# Patient Record
Sex: FEMALE | URBAN - METROPOLITAN AREA
[De-identification: names, ages, dates, MRNs, and addresses within clinical notes are randomized per-mention and may not be internally consistent; named-entity substitution may affect disease eponyms.]

---

## 2023-09-27 ENCOUNTER — APPOINTMENT (RX ONLY)
Dept: URBAN - METROPOLITAN AREA CLINIC 125 | Facility: CLINIC | Age: 43
Setting detail: DERMATOLOGY
End: 2023-09-27

## 2023-09-27 DIAGNOSIS — Q826 OTHER SPECIFIED ANOMALIES OF SKIN: ICD-10-CM

## 2023-09-27 DIAGNOSIS — Q828 OTHER SPECIFIED ANOMALIES OF SKIN: ICD-10-CM

## 2023-09-27 DIAGNOSIS — L23.9 ALLERGIC CONTACT DERMATITIS, UNSPECIFIED CAUSE: ICD-10-CM

## 2023-09-27 DIAGNOSIS — Q819 OTHER SPECIFIED ANOMALIES OF SKIN: ICD-10-CM

## 2023-09-27 PROBLEM — L85.8 OTHER SPECIFIED EPIDERMAL THICKENING: Status: ACTIVE | Noted: 2023-09-27

## 2023-09-27 PROCEDURE — ? PRESCRIPTION

## 2023-09-27 PROCEDURE — ? PRESCRIPTION MEDICATION MANAGEMENT

## 2023-09-27 PROCEDURE — 99203 OFFICE O/P NEW LOW 30 MIN: CPT

## 2023-09-27 PROCEDURE — ? COUNSELING

## 2023-09-27 RX ORDER — TRIAMCINOLONE ACETONIDE 0.25 MG/G
OINTMENT TOPICAL
Qty: 80 | Refills: 2 | Status: ERX | COMMUNITY
Start: 2023-09-27

## 2023-09-27 RX ADMIN — TRIAMCINOLONE ACETONIDE: 0.25 OINTMENT TOPICAL at 00:00

## 2023-09-27 ASSESSMENT — LOCATION DETAILED DESCRIPTION DERM
LOCATION DETAILED: RIGHT AXILLARY VAULT
LOCATION DETAILED: MONS PUBIS
LOCATION DETAILED: LEFT PROXIMAL PRETIBIAL REGION
LOCATION DETAILED: RIGHT PROXIMAL PRETIBIAL REGION
LOCATION DETAILED: LEFT AXILLARY VAULT
LOCATION DETAILED: MIDDLE STERNUM

## 2023-09-27 ASSESSMENT — LOCATION ZONE DERM
LOCATION ZONE: LEG
LOCATION ZONE: TRUNK
LOCATION ZONE: VULVA
LOCATION ZONE: AXILLAE

## 2023-09-27 ASSESSMENT — LOCATION SIMPLE DESCRIPTION DERM
LOCATION SIMPLE: LEFT AXILLARY VAULT
LOCATION SIMPLE: RIGHT AXILLARY VAULT
LOCATION SIMPLE: CHEST
LOCATION SIMPLE: LEFT PRETIBIAL REGION
LOCATION SIMPLE: RIGHT PRETIBIAL REGION
LOCATION SIMPLE: GROIN

## 2023-09-27 NOTE — HPI: RASH
What Type Of Note Output Would You Prefer (Optional)?: Standard Output
How Severe Is Your Rash?: mild
Is This A New Presentation, Or A Follow-Up?: Rash
Additional History: Patient states she has been experiencing itching and discomfort on her legs and in the center of her chest. She states that she moisturizers with a cetaphil cream after showering but does not notice any improvement. She would like to discuss treatment options available to reduce intensely itchy episodes.
How Severe Is Your Rash?: moderate
Additional History: Patient states rash has been present for a number of years. She has been seen by dermatologists in the past who gave her triamcinolone and ketoconazole cream. She states that despite directed application of topicals, she did not see improvement on treatment. She has been applying a deodorant on she purchases from [employer] (unspecified brand or type). She has had a history of laser hair removal but discontinued due to current irritation.

## 2023-09-27 NOTE — PROCEDURE: PRESCRIPTION MEDICATION MANAGEMENT
Samples Given: :\\n- CeraVe Benzoyl Peroxide wash (4%)
Initiate Treatment: :\\n- triamcinolone acetonide 0.025 % topical ointment: Apply to affected areas bid x 1 month.
Plan: :\\n- advised patient to discontinue natural deodorant and all deodorants in general (discussed smell is due to bacteria on skin)\\n- recommended patient apply benzoyl peroxide wash (4%) and let sit for 1-2 minutes on armpits before rinsing.\\n- when washing body, wash with Dove unscented soap bar (discontinue Neutrogena body wash)\\n- patient encouraged to apply Aquaphor or Vaseline to armpit and genitalia for irritation and itching.\\n- Advised patient to avoid fragranced cleansers, bath bombs, or any wipes to genitalia to avoid irritation.
Detail Level: Zone
Render In Strict Bullet Format?: No
Plan: :\\n- Patient currently using Neutrogena body wash and is encouraged to switch to gentle cleansers (Dove Unscented soap bar)\\n- Discussed waxing is known to cause irritation in the leg area.